# Patient Record
(demographics unavailable — no encounter records)

---

## 2024-10-08 NOTE — REASON FOR VISIT
[FreeTextEntry1] : Follow-up  60yo w/ h/o breast ca 1980's with uterine and rectal prolapse with fecal incontinence here to discuss TLH, RSO. Pt is scheduled with Dr. Medina (urogyn) and Dr. Knox to have PORFIRIO in 11/26/2024. Pt thought it would be combines with Dr. Sharma, however, it does not seem the surgery scheduled includes BSO which pt desires as well.  Pt is s/p Sacral nerve stimulator placement stage I and stage II with Dr. Knox for fecal incontinence which resolved after procedure.   Colonoscopy 7/15/24: internal hemorrhoids, rectal prolapse, normal mucosa PAP: ASCUS, HPV neg 4/16/24

## 2024-10-08 NOTE — PHYSICAL EXAM
[Chaperone Present] : A chaperone was present in the examining room during all aspects of the physical examination [[id number]] : A chaperone was present during the pelvic exam. [Abnormal] : Adnexa(ae): Abnormal [Normal] : Anus and perineum: Normal sphincter tone, no masses, no prolapse. [de-identified] : rectocele to level of introitus [de-identified] : apical prolapse to level of introitus [de-identified] : Discomfort along right aspect of the bladder/right adnexa now improved

## 2024-10-08 NOTE — CHIEF COMPLAINT
[FreeTextEntry1] : New Consult  60yo referred by Dr. Hamlin for RLQ x 1 month, radiates to hip, painful- took Tylenol without relief. + bloating for a couple months. Currently pain is a little better, denies n/v, changes in bowel habits/urinary habits. Denes VB.  Of note, patient noted hemorrhoids flare around the same time but does not think the pain is related to her hemorrhoids. Denies recent constipation or blood in her stool. This is not her first episode of hemorrhoids.   OBHX:  x 2 GYNHX: Breast ca  on tamoxifen x 2 years, endometriosis, age 36 went through menopause (but got pregnant in ). 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  FAM: Father: Colon cancer age 70    Last Mammogram:  3/2024- negative Last pap: >10 years ago- DUE Last Colonoscopy: 10 years ago- DUE

## 2024-10-08 NOTE — ASSESSMENT
[FreeTextEntry1] : Persistent POP noted today but pelvic pain has improved. Hemorrhoids are also improved on exam today. Patient is already scheduled to have surgery with urogyn and colorectal surgery on [place].   I advised that she follow up with Dr. Medina pre-op regarding the following: - Indications for TLH vs. PORFIRIO (patient low risk for cervical cancer, but she would prefer cervix removed if it is not necessary for her to keep it) - Failure rates with and without mesh - Complication rates with and without mesh - My recommendation for removal of fallopian tubes and ovaries - Risk of adhesions from multiple prior surgeries  Follow up PRN for routine gyn care. Plan discussed with Dr. Hamlin

## 2024-10-08 NOTE — CHIEF COMPLAINT
[FreeTextEntry1] : New Consult  60yo referred by Dr. Hamlin for RLQ x 1 month, radiates to hip, painful- took Tylenol without relief. + bloating for a couple months. Currently pain is a little better, denies n/v, changes in bowel habits/urinary habits. Denes VB.  Of note, patient noted hemorrhoids flare around the same time but does not think the pain is related to her hemorrhoids. Denies recent constipation or blood in her stool. This is not her first episode of hemorrhoids.   OBHX:  x 2 GYNHX: Breast ca  on tamoxifen x 2 years, endometriosis, age 36 went through menopause (but got pregnant in ). 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  FAM: Father: Colon cancer age 70    Last Mammogram:  3/2024- negative Last pap: >10 years ago- DUE Last Colonoscopy: 10 years ago- DUE

## 2024-10-08 NOTE — HISTORY OF PRESENT ILLNESS
[FreeTextEntry1] : Problem: 1) Fibroids 2) Prolapse Uterine 3) h/o breast CA 1985  * genetics neg 7/17/24  Previous Therapies: 1) Pelvic MRI 4/8/24     a) Uterus 2.9 x 3.4 x 4.8cm     b) Anterior intramural Fibroid 0.5cm and a Left uterine segment pedunculated fibroid 1.1 x 1.0cm stable compared to 5/2022 MRI.      c) RO 0.8 x 1.3cm with a Right adnexal lesion 1.1 x 1.0cm without suspicious features- stable- possibly old hematosalpinx

## 2024-10-08 NOTE — PHYSICAL EXAM
[Chaperone Present] : A chaperone was present in the examining room during all aspects of the physical examination [[id number]] : A chaperone was present during the pelvic exam. [Abnormal] : Adnexa(ae): Abnormal [Normal] : Anus and perineum: Normal sphincter tone, no masses, no prolapse. [de-identified] : rectocele to level of introitus [de-identified] : apical prolapse to level of introitus [de-identified] : Discomfort along right aspect of the bladder/right adnexa now improved

## 2024-10-08 NOTE — ASSESSMENT
[FreeTextEntry1] : Persistent POP noted today but pelvic pain has improved. Hemorrhoids are also improved on exam today. Patient is already scheduled to have surgery with urogyn and colorectal surgery on [place].   I advised that she follow up with Dr. Medina pre-op regarding the following: - Indications for TLH vs. PORFIRIO (patient low risk for cervical cancer, but she would prefer cervix removed if it is not necessary for her to keep it) - Failure rates with and without mesh - Complication rates with and without mesh - My recommendation for removal of fallopian tubes and ovaries - Risk of adhesions from multiple prior surgeries  Follow up PRN for routine gyn care. Plan discussed with Dr. Hamlin

## 2024-10-08 NOTE — REASON FOR VISIT
[FreeTextEntry1] : Follow-up  58yo w/ h/o breast ca 1980's with uterine and rectal prolapse with fecal incontinence here to discuss TLH, RSO. Pt is scheduled with Dr. Medina (urogyn) and Dr. Knox to have PORFIRIO in 11/26/2024. Pt thought it would be combines with Dr. Sharma, however, it does not seem the surgery scheduled includes BSO which pt desires as well.  Pt is s/p Sacral nerve stimulator placement stage I and stage II with Dr. Knox for fecal incontinence which resolved after procedure.   Colonoscopy 7/15/24: internal hemorrhoids, rectal prolapse, normal mucosa PAP: ASCUS, HPV neg 4/16/24

## 2024-10-28 NOTE — ASSESSMENT
[FreeTextEntry1] : Currently without clinical evidence of residual rectal/mucosal prolapse.  Recommend continued plans with GYN surgery.  If symptoms of recurrent prolapse develop advised MRI defecography and further evaluation and potential surgical treatment as required.

## 2024-10-28 NOTE — PHYSICAL EXAM
[Excoriation] : no perianal excoriation [Skin Tags] : there were no residual hemorrhoidal skin tags seen [Lax] : was lax [None] : there was no rectal mass  [de-identified] : Mild right-sided mucosal ectropion.  No prolapse.  No evidence of prolapse on forceful straining. [FreeTextEntry1] : Medical assistant was present for the entire exam.  Anoscopy was performed for evaluation of the patients rectal bleeding  history . The risks, benefits and alternatives were reviewed.  A lighted anoscope was passed into the anal canal and the entire anal mucosal surface was inspected..   The findings revealed minimal internal hemorrhoids. No masses or lesions were identified.

## 2024-10-28 NOTE — PHYSICAL EXAM
[Excoriation] : no perianal excoriation [Skin Tags] : there were no residual hemorrhoidal skin tags seen [Lax] : was lax [None] : there was no rectal mass  [de-identified] : Mild right-sided mucosal ectropion.  No prolapse.  No evidence of prolapse on forceful straining. [FreeTextEntry1] : Medical assistant was present for the entire exam.  Anoscopy was performed for evaluation of the patients rectal bleeding  history . The risks, benefits and alternatives were reviewed.  A lighted anoscope was passed into the anal canal and the entire anal mucosal surface was inspected..   The findings revealed minimal internal hemorrhoids. No masses or lesions were identified.

## 2024-10-28 NOTE — HISTORY OF PRESENT ILLNESS
[FreeTextEntry1] : 58 y/o F presents for initial evaluation   Reason for visit: Referred by: Dr. 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 Left ovary not visualized. -Stable small right adnexal lesion w/o suspicious features possibly old hematosalpinx. No suspicious adnexal mass -Stable small fibroids.   Last Colonoscopy 07/15/2024, Dr. Freeman Impression:  -Rectal Prolapse -Normal Mucosa in the terminal ileum -Internal hemorrhoids -colon was otherwise unremarkable --Repeat in 5 years.   Patient previously seen by GYN, UROGYN Dr. Medina, CRS Dr. Knox for RLQ pain, pelvic organ prolapse, urinary and fecal incontinence. Reported Fecal incontinence since surgery for [place]. Managed on Imod[place]. Exam w/ CRS Lisette noted Left lateral mucosal prolapse with overlying ulceration. No full-thickness prolapse.   s/p sacral nerve stimulator placement stage I and stage II with Dr. Knox 2024 - reports helped resolved with patient.   s/p Excision of rectal mucosal prolapse by Dr. Knox  08/15/2024  Patient seen in post op follow up w/ Dr. Knox 24, 2024 w/ improvement of bleeding and resolution of incontinence. Scheduled for Robotic AMILCAR and Ureterolysis with Dr. Medina and Dr. Knox 2024 but now cancelled.   Patient seen by GYN ONC Edith 10/08/2024 reporting persistent prolapse of pelvic organs. Exam noted rectocele to level of introitus. Anus and perineum: Normal sphincter tone, no masses, no prolapse.   Patient presents today for presurgical chat.  Reports fecal/urinary incontinence has improved significantly since procedure.  States feels like hard is more solid and can manage bowel movement better.  Will still feel urgency to go.  Urinary and fecal accidents occur less frequently.   Reports feels a lot more comfortable after the excision of rectal mucosal in 2024. Still feels slight tissue protruding.  Will see rectal bleeding throughout the day when wiping.  Occasionally will feel pressure and discomfort in rectal area.   Currently bowel movement one to two times a day. Stool is soft and formed. Denies constipation.  Denies taking fiber supplement.  Denies any recent scans.

## 2024-11-11 NOTE — HISTORY OF PRESENT ILLNESS
[FreeTextEntry1] : 59-year-old  female here for evaluation of pelvic organ prolapse. History of breast CA (), uterine fibroids, hematosalpinx, apical and posterior prolapse, urinary and fecal incontinence. Plan for total hysterectomy with Dr. Sharma. Here today to discuss prolapse repair. She has recurrent UTIs which she associates with prolapse. Treated by GYN 5-7 times over the past year. Doubles voids and sometimes has a weak stream. Urinary and fecal incontinence managed by [facility] SNS.   UDS (10/1/24)-Stress urinary incontinence. PVR-0ml    PMH-breast CA, fibroids, endometriosis, anal atresia PSH-appendectomy (), removal of 2nd set ovaries and fallopian tubes, AMILCAR, excision of rectal mucosal prolapse FamHx-denies  malignancies SocHx-denies smoking, drugs or alcohol use Allergies-shellfish

## 2024-11-11 NOTE — PHYSICAL EXAM
[General Appearance - Well Developed] : well developed [General Appearance - Well Nourished] : well nourished [Normal Appearance] : normal appearance [Well Groomed] : well groomed [General Appearance - In No Acute Distress] : no acute distress [] : no respiratory distress [External Female Genitalia] : normal external genitalia [Normal Station and Gait] : the gait and station were normal for the patient's age [Skin Color & Pigmentation] : normal skin color and pigmentation [No Focal Deficits] : no focal deficits [Oriented To Time, Place, And Person] : oriented to person, place, and time [Affect] : the affect was normal [Mood] : the mood was normal [Not Anxious] : not anxious [Chaperone Present] : A chaperone was present in the examining room during all aspects of the physical examination [[id number]] : A chaperone was present during the pelvic exam. [de-identified] : Stage 2 apical prolapse, stage 2 posterior prolapse [FreeTextEntry2] : Ting Madden NP

## 2024-11-11 NOTE — HISTORY OF PRESENT ILLNESS
[FreeTextEntry1] : 59-year-old  female here for evaluation of pelvic organ prolapse. History of breast CA (), uterine fibroids, hematosalpinx, apical and posterior prolapse, urinary and fecal incontinence. Plan for total hysterectomy with Dr. Sharma. Here today to discuss prolapse repair. She has recurrent UTIs which she associates with prolapse. Treated by GYN 5-7 times over the past year. Doubles voids and sometimes has a weak stream. Urinary and fecal incontinence managed by [facility].   UDS (10/1/24)-Stress urinary incontinence. PVR-0ml    PMH-breast CA, fibroids, endometriosis, anal atresia PSH-appendectomy (), removal of 2nd set ovaries and fallopian tubes, AMILCAR, excision of rectal mucosal prolapse FamHx-denies  malignancies SocHx-denies smoking, drugs or alcohol use Allergies-shellfish

## 2024-11-11 NOTE — ASSESSMENT
[FreeTextEntry1] :   Impression/plan: 59-year-old female with POP-stage 2 apical and posterior prolapse.   Surgical plan explained using visual demonstration. TLH to be done by Dr. Sharma. She requires SSF. Sling recommended as RADHA was demonstrated on UDS. Risks reviewed. Patient verbalized understanding.  Will coordinate with Dr. Sharma.   I, Dr. Prachi Hansen, personally performed the evaluation and management (E/M) services for this new patient.  That E/M includes conducting the clinically appropriate initial history &/or exam, assessing all conditions, and establishing the plan of care.  Today, my SOCRATES Ting, was here to observe &/or participate in the visit & follow plan of care established by me.

## 2024-11-11 NOTE — PHYSICAL EXAM
[General Appearance - Well Developed] : well developed [General Appearance - Well Nourished] : well nourished [Normal Appearance] : normal appearance [Well Groomed] : well groomed [General Appearance - In No Acute Distress] : no acute distress [] : no respiratory distress [External Female Genitalia] : normal external genitalia [Normal Station and Gait] : the gait and station were normal for the patient's age [Skin Color & Pigmentation] : normal skin color and pigmentation [No Focal Deficits] : no focal deficits [Oriented To Time, Place, And Person] : oriented to person, place, and time [Affect] : the affect was normal [Mood] : the mood was normal [Not Anxious] : not anxious [Chaperone Present] : A chaperone was present in the examining room during all aspects of the physical examination [[id number]] : A chaperone was present during the pelvic exam. [de-identified] : Stage 2 apical prolapse, stage 2 posterior prolapse [FreeTextEntry2] : Ting Madden NP

## 2024-11-15 NOTE — REASON FOR VISIT
[FreeTextEntry1] : Follow-up  60yo w/ h/o breast ca 1980's with uterine and rectal prolapse s/p sacral nerve stimulator and rectal prolapse excision here to discuss and move forward with combined case with Dr. Hansen for TLH, RSO, SSF, Sling.   Colonoscopy 7/15/24: internal hemorrhoids, rectal prolapse, normal mucosa PAP: ASCUS, HPV neg 4/16/24

## 2024-11-15 NOTE — ASSESSMENT
[FreeTextEntry1] : I discussed with the patient with the aid of diagrams, reviewed the findings on history and physical examination, and reviewed the imaging studies in detail. We discussed that I feel she will benefit from correction of her prolapse, but that I am not sure if her "pressure" symptoms and discomfort will be corrected by the procedure. Her uterus is small.  	 Surgical approach of hysterectomy also discussed- including minimally invasive and open approaches. Minimally invasive approach will be attempted if appropriate, however if the size of the uterus/fibroids exceed that which is appropriate laparoscopically, an open abdominal approach will be selected.  Complications that include, but are not limited to: bleeding, infection, injury to other organs including bowel, bladder, ureters, blood vessels, nerves; infections, blood clots, lymphedema, pneumonia, wound complications and prolonged hospital stay have all been discussed with the patient. Whenever minimally invasive surgery is attempted, there is a chance of needing to convert to laparotomy. The risk of occult injury requiring additional surgery also discussed. I have also provided her with the diagrams.    Risks specific to the correction of her apical prolapse and TVT were discussed by Dr. Hansen.  Surgical scheduling was discussed and instructions for optimization prior to surgery were given. will follow the Enhanced Recovery After Surgery (ERAS) protocol.   She will choose a surgical date. No aspirin or NSAID products for 1 week prior.   [] Medical clearance [] Pre-op labs [] Robot assisted TLH/BSO to be coordinated with Dr. Hansen

## 2024-11-15 NOTE — REASON FOR VISIT
[FreeTextEntry1] : Follow-up  58yo w/ h/o breast ca 1980's with uterine and rectal prolapse s/p sacral nerve stimulator and rectal prolapse excision here to discuss and move forward with combined case with Dr. Hansen for TLH, RSO, SSF, Sling.   Colonoscopy 7/15/24: internal hemorrhoids, rectal prolapse, normal mucosa PAP: ASCUS, HPV neg 4/16/24

## 2024-11-15 NOTE — HISTORY OF PRESENT ILLNESS
[FreeTextEntry1] : Problem: 1) Fibroids 2) Prolapse Uterine 3) h/o breast CA 1985  * genetics neg 7/17/24  Previous Therapies: 1) Pelvic MRI 4/8/24     a) Uterus 2.9 x 3.4 x 4.8cm     b) Anterior intramural Fibroid 0.5cm and a Left uterine segment pedunculated fibroid 1.1 x 1.0cm stable compared to 5/2022 MRI.      c) RO 0.8 x 1.3cm with a Right adnexal lesion 1.1 x 1.0cm without suspicious features- stable- possibly old hematosalpinx  2) PAP: ASCUS, HPV neg 4/16/24

## 2024-11-15 NOTE — REVIEW OF SYSTEMS
[Negative] : Musculoskeletal [Hematuria] : no hematuria [Dysuria] : no dysuria [Dyspareunia] : no dyspareunia [Incontinence] : no incontinence [Bloody Stools] : no bloody stools [Diarrhea] : no diarrhea

## 2024-12-06 NOTE — ASSESSMENT
[FreeTextEntry1] : Patient was scheduled today to discuss her upcoming surgery.  -	Indications for the surgery and risks, alternative and benefits were once again discussed in detail. She had an opportunity to ask questions. -	Her medical clearance form was reviewed and found to be appropriate.  -	Her current medications were reviewed, and no medications need to be help pre-operatively.  -	Labs were reviewed.  The pre-operative booklet was reviewed so that she would understand where to find important instructions for the day before and the day of surgery.   She is schedule for a robot assisted TLH/BSO and SSF/TVT with Dr. Hansen 12/12/14

## 2024-12-06 NOTE — REASON FOR VISIT
[FreeTextEntry1] : Preop clearance  60yo with prolapse uterus/fibroids here for pre-op clearance.   Medical clearance:  Meds: Preop labs: done 11/15 Path review: N/A Meds to stop:

## 2024-12-19 NOTE — ASSESSMENT
[FreeTextEntry1] :  s/p RA-TLH, RSO, AMILCAR w/ Posterior colporrhapy, cystoscopy with bilateral retrograde pyelogram, insertion of mid urethral sling by Urology. Meeting post-op milestones Benign Pathology  [] post-op precautions given [] f/u with Dr. Sharma in 3 weeks

## 2024-12-19 NOTE — REASON FOR VISIT
[FreeTextEntry1] : 1 week post-op   60yo s/p RA-TLH, RSO, AMILCAR here for 1 week post-op visit. Pt passed a trial of void at the urologist office earlier today. Pain is controlled, feeling sore. + BMs.

## 2024-12-19 NOTE — HISTORY OF PRESENT ILLNESS
[FreeTextEntry1] : Problem: 1) Fibroids 2) Prolapse Uterine 3) h/o breast CA 1985  * genetics neg 7/17/24  Previous Therapies: 1) Pelvic MRI 4/8/24     a) Uterus 2.9 x 3.4 x 4.8cm     b) Anterior intramural Fibroid 0.5cm and a Left uterine segment pedunculated fibroid 1.1 x 1.0cm stable compared to 5/2022 MRI.      c) RO 0.8 x 1.3cm with a Right adnexal lesion 1.1 x 1.0cm without suspicious features- stable- possibly old hematosalpinx  2) PAP: ASCUS, HPV neg 4/16/24     3)  s/p RA-TLH, RSO, AMILCAR w/ Posterior colporrhapy, cystoscopy with bilateral retrograde pyelogram, insertion of mid urethral sling by Urology 12/12/24      a)  Uterus, cervix, right fallopian tube and right ovary; total hysterectomy and right salpingo-oophorectomy: -   Benign cervix -   Inactive endometrium -   Myometrium with adenomyosis -   Benign fallopian tube and ovary

## 2024-12-26 NOTE — HISTORY OF PRESENT ILLNESS
[FreeTextEntry1] : 58 yo woman 2 weeks s/p rectocele repair and MUS, feels bladder sensation, dysuria, voiding well.   States urine has foul odor.   Urine c/s citropbacter  PVR - 0 ml.   Plan: Bactrim x 3 days F/u in 2 weeks.

## 2024-12-26 NOTE — HISTORY OF PRESENT ILLNESS
[FreeTextEntry1] : 60 yo woman 2 weeks s/p rectocele repair and MUS, feels bladder sensation, dysuria, voiding well.   States urine has foul odor.   Urine c/s citropbacter  PVR - 0 ml.   Plan: Bactrim x 3 days F/u in 2 weeks.

## 2025-01-08 NOTE — HISTORY OF PRESENT ILLNESS
[FreeTextEntry1] : 60 yo woman 2 weeks s/p rectocele repair and MUS, feels bladder sensation, dysuria, voiding well.  States urine has foul odor.  Urine c/s citropbacter  PVR - 0 ml.  Plan: Bactrim x 3 days F/u in 2 weeks.   1/8/25  59-year-old female s/p rectocele repair and MUS on 12/12/24. Treated with Bactrim for Citrobacter freundii UTI.   The patient is still having some vaginal bother as well as foul-smelling urine.  She is unsure if she has a urinary tract infection.  On exam today with Ting as chaperone, all incisions are healing with sutures intact.  She is still quite sore vaginally.  No prolapse appreciated.  Negative cough stress test.  Plan: She may resume all activities at 6 weeks postoperatively.  We discussed the soreness is likely due to edema and swelling postop, hopefully this should improve in the next few weeks.  Urine sent for culture and sensitivity.  Follow-up in 3 months.

## 2025-01-15 NOTE — REASON FOR VISIT
[FreeTextEntry1] : 1 Month post-op   60yo s/p RA-TLH, RSO, AMILCAR here for 1 month post-op visit. Pt reports 2 weeks of vaginal bleeding following surgery as well as dysuria. Pt was treated by urologist for UTI, antibiotics completed last week. Pt reports bleeding has resolved. Since surgery pt also endorses extreme fatigue and weakness and two episodes of syncope (without head trauma). Pt reports the most recent episode was on Friday in the shower that was preceded by blurry vision. Pt has not informed her PCP about these episodes and has never had any history of neurologic/cardiac issues. Endorses intermittent palpitations since surgery, pt is unsure what makes them better/worse.    Denies vaginal bleeding, pelvic/abdominal pain, SOB, CP, dysuria (since completion of antibiotics), hematuria.   Length To Time In Minutes Device Was In Place: 10

## 2025-01-15 NOTE — PHYSICAL EXAM
[Chaperone Present] : A chaperone was present in the examining room during all aspects of the physical examination [[id number]] : A chaperone was present during the pelvic exam. [Absent] : Adnexa(ae): Absent [Normal] : Anus and perineum: Normal sphincter tone, no masses, no prolapse. [Abnormal] : Recto-Vaginal Exam: Abnormal [de-identified] : incisions c/d/i [de-identified] : edema and stitches present at vaginal wall. vaginal cuff well healed. [de-identified] : prolapse

## 2025-01-15 NOTE — REASON FOR VISIT
[FreeTextEntry1] : 1 Month post-op   58yo s/p RA-TLH, RSO, AMILCAR here for 1 month post-op visit. Pt reports 2 weeks of vaginal bleeding following surgery as well as dysuria. Pt was treated by urologist for UTI, antibiotics completed last week. Pt reports bleeding has resolved. Since surgery pt also endorses extreme fatigue and weakness and two episodes of syncope (without head trauma). Pt reports the most recent episode was on Friday in the shower that was preceded by blurry vision. Pt has not informed her PCP about these episodes and has never had any history of neurologic/cardiac issues. Endorses intermittent palpitations since surgery, pt is unsure what makes them better/worse.    Denies vaginal bleeding, pelvic/abdominal pain, SOB, CP, dysuria (since completion of antibiotics), hematuria.

## 2025-01-15 NOTE — PHYSICAL EXAM
[Chaperone Present] : A chaperone was present in the examining room during all aspects of the physical examination [17716] : A chaperone was present during the pelvic exam. [Absent] : Adnexa(ae): Absent [Normal] : Anus and perineum: Normal sphincter tone, no masses, no prolapse. [Abnormal] : Recto-Vaginal Exam: Abnormal [de-identified] : incisions c/d/i [de-identified] : edema and stitches present at vaginal wall. vaginal cuff well healed. [de-identified] : prolapse

## 2025-01-15 NOTE — REASON FOR VISIT
[FreeTextEntry1] : 1 Month post-op   60yo s/p RA-TLH, RSO, AMILCAR here for 1 month post-op visit. Pt reports 2 weeks of vaginal bleeding following surgery as well as dysuria. Pt was treated by urologist for UTI, antibiotics completed last week. Pt reports bleeding has resolved. Since surgery pt also endorses extreme fatigue and weakness and two episodes of syncope (without head trauma). Pt reports the most recent episode was on Friday in the shower that was preceded by blurry vision. Pt has not informed her PCP about these episodes and has never had any history of neurologic/cardiac issues. Endorses intermittent palpitations since surgery, pt is unsure what makes them better/worse.    Denies vaginal bleeding, pelvic/abdominal pain, SOB, CP, dysuria (since completion of antibiotics), hematuria.

## 2025-01-15 NOTE — ASSESSMENT
[FreeTextEntry1] : Benign pathology discussed with patient. Vaginal cuff and skin incisions well healed.   She continues to feel bladder pressure with urination and has visible stitches in the vaginal mucosa. I encouraged her to follow up with Dr. Hansen in the next few weeks  We spent a long time discussing her recovery from surgery, and I explained that the fainting episodes are concerning. Both episodes happened last week (almost 1 month after surgery) and therefore may be unrelated to the actual surgery. We discussed that losing consciousness has a wide ddx including cardiac events/arrhythmias. I STRONGLY encouraged her to either see her PCP or go to the ER. She declines going to the ER at this time.   [] f/u w/ PCP regarding episodes of syncope/dizziness- rule out arrhythmia  [] CBC/CMP/Mg/Phos drawn. rule out electrolyte derangements.   Labs reviewed and normal.

## 2025-01-15 NOTE — HISTORY OF PRESENT ILLNESS
[FreeTextEntry1] : Problem: 1) Fibroids 2) Prolapse Uterine 3) h/o breast CA 1985  * genetics neg 7/17/24  Previous Therapies: 1) Pelvic MRI 4/8/24     a) Uterus 2.9 x 3.4 x 4.8cm     b) Anterior intramural Fibroid 0.5cm and a Left uterine segment pedunculated fibroid 1.1 x 1.0cm stable compared to 5/2022 MRI.      c) RO 0.8 x 1.3cm with a Right adnexal lesion 1.1 x 1.0cm without suspicious features- stable- possibly old hematosalpinx  2) PAP: ASCUS, HPV neg 4/16/24     3)  s/p RA-TLH, RSO, AMILCRA w/ Posterior colporrhapy, cystoscopy with bilateral retrograde pyelogram, insertion of mid urethral sling by Urology 12/12/24      a)  Uterus, cervix, right fallopian tube and right ovary; total hysterectomy and right salpingo-oophorectomy: -   Benign cervix -   Inactive endometrium -   Myometrium with adenomyosis -   Benign fallopian tube and ovary

## 2025-01-15 NOTE — PHYSICAL EXAM
[Chaperone Present] : A chaperone was present in the examining room during all aspects of the physical examination [[id number]] : A chaperone was present during the pelvic exam. [Absent] : Adnexa(ae): Absent [Normal] : Anus and perineum: Normal sphincter tone, no masses, no prolapse. [Abnormal] : Recto-Vaginal Exam: Abnormal [de-identified] : incisions c/d/i [de-identified] : edema and stitches present at vaginal wall. vaginal cuff well healed. [de-identified] : prolapse

## 2025-02-04 NOTE — HISTORY OF PRESENT ILLNESS
[FreeTextEntry1] : 60 yo woman 2 weeks s/p rectocele repair and MUS, feels bladder sensation, dysuria, voiding well.  States urine has foul odor.  Urine c/s citropbacter  PVR - 0 ml.  Plan: Bactrim x 3 days F/u in 2 weeks.   1/8/25  59-year-old female s/p rectocele repair and MUS on 12/12/24. Treated with Bactrim for Citrobacter freundii UTI.  The patient is still having some vaginal bother as well as foul-smelling urine. She is unsure if she has a urinary tract infection.  On exam today with Ting as chaperone, all incisions are healing with sutures intact. She is still quite sore vaginally. No prolapse appreciated. Negative cough stress test.  Plan: She may resume all activities at 6 weeks postoperatively. We discussed the soreness is likely due to edema and swelling postop, hopefully this should improve in the next few weeks. Urine sent for culture and sensitivity. Follow-up in 3 months.   2/4/25  59-year-old female s/p rectocele repair and MUS on 12/12/24.   Urine Culture 1/16/2025<10,000 CFU/mL Normal Urogenital Laura 1/10/2025<10,000 CFU/mL Normal Urogenital Laura  Patient states that she is still having some vaginal discomfort/feels like swollen down below.  She denies any voiding issues.  Reports her residuals have been 0 ml.   On exam today with Paulina as chaperone, all sutures have healed from the anterior posterior vaginal wall, sutures are noted at the cuff from her closure.  She is still slightly tender on exam, reconstruction looks excellent, excellent anterior apical and posterior support.  No sign of mesh extrusion, negative cough stress test.  Plan: Recommend Replens for vaginal discomfort, and pelvic rest for another month.  She will follow-up at that point for repeat exam.  Patient is reassured.

## 2025-02-04 NOTE — HISTORY OF PRESENT ILLNESS
[FreeTextEntry1] : 58 yo woman 2 weeks s/p rectocele repair and MUS, feels bladder sensation, dysuria, voiding well.  States urine has foul odor.  Urine c/s citropbacter  PVR - 0 ml.  Plan: Bactrim x 3 days F/u in 2 weeks.   1/8/25  59-year-old female s/p rectocele repair and MUS on 12/12/24. Treated with Bactrim for Citrobacter freundii UTI.  The patient is still having some vaginal bother as well as foul-smelling urine. She is unsure if she has a urinary tract infection.  On exam today with Ting as chaperone, all incisions are healing with sutures intact. She is still quite sore vaginally. No prolapse appreciated. Negative cough stress test.  Plan: She may resume all activities at 6 weeks postoperatively. We discussed the soreness is likely due to edema and swelling postop, hopefully this should improve in the next few weeks. Urine sent for culture and sensitivity. Follow-up in 3 months.   2/4/25  59-year-old female s/p rectocele repair and MUS on 12/12/24.   Urine Culture 1/16/2025<10,000 CFU/mL Normal Urogenital Laura 1/10/2025<10,000 CFU/mL Normal Urogenital Laura  Patient states that she is still having some vaginal discomfort/feels like swollen down below.  She denies any voiding issues.  Reports her residuals have been 0 ml.   On exam today with Paulina as chaperone, all sutures have healed from the anterior posterior vaginal wall, sutures are noted at the cuff from her closure.  She is still slightly tender on exam, reconstruction looks excellent, excellent anterior apical and posterior support.  No sign of mesh extrusion, negative cough stress test.  Plan: Recommend Replens for vaginal discomfort, and pelvic rest for another month.  She will follow-up at that point for repeat exam.  Patient is reassured.

## 2025-04-07 NOTE — HISTORY OF PRESENT ILLNESS
[FreeTextEntry1] : 60 yo woman 2 weeks s/p rectocele repair and MUS, feels bladder sensation, dysuria, voiding well.  States urine has foul odor.  Urine c/s citropbacter  PVR - 0 ml.  Plan: Bactrim x 3 days F/u in 2 weeks.   1/8/25  59-year-old female s/p rectocele repair and MUS on 12/12/24. Treated with Bactrim for Citrobacter freundii UTI.  The patient is still having some vaginal bother as well as foul-smelling urine. She is unsure if she has a urinary tract infection.  On exam today with Ting as chaperone, all incisions are healing with sutures intact. She is still quite sore vaginally. No prolapse appreciated. Negative cough stress test.  Plan: She may resume all activities at 6 weeks postoperatively. We discussed the soreness is likely due to edema and swelling postop, hopefully this should improve in the next few weeks. Urine sent for culture and sensitivity. Follow-up in 3 months.   2/4/25  59-year-old female s/p rectocele repair and MUS on 12/12/24.  Urine Culture 1/16/2025<10,000 CFU/mL Normal Urogenital Laura 1/10/2025<10,000 CFU/mL Normal Urogenital Laura  Patient states that she is still having some vaginal discomfort/feels like swollen down below. She denies any voiding issues. Reports her residuals have been 0 ml.  On exam today with Paulina as chaperone, all sutures have healed from the anterior posterior vaginal wall, sutures are noted at the cuff from her closure. She is still slightly tender on exam, reconstruction looks excellent, excellent anterior apical and posterior support. No sign of mesh extrusion, negative cough stress test.  Plan: Recommend Replens for vaginal discomfort, and pelvic rest for another month. She will follow-up at that point for repeat exam. Patient is reassured.  4/8/25  60-year-old female s/p rectocele repair and MUS on 12/12/24.